# Patient Record
Sex: MALE | Race: WHITE
[De-identification: names, ages, dates, MRNs, and addresses within clinical notes are randomized per-mention and may not be internally consistent; named-entity substitution may affect disease eponyms.]

---

## 2020-01-20 ENCOUNTER — HOSPITAL ENCOUNTER (OUTPATIENT)
Dept: HOSPITAL 92 - SCSULT | Age: 25
Discharge: HOME | End: 2020-01-20
Attending: FAMILY MEDICINE
Payer: COMMERCIAL

## 2020-01-20 DIAGNOSIS — N50.89: Primary | ICD-10-CM

## 2020-01-20 PROCEDURE — 76870 US EXAM SCROTUM: CPT

## 2020-01-20 PROCEDURE — 93976 VASCULAR STUDY: CPT

## 2020-01-20 NOTE — ULT
SCROTAL ULTRASOUND



INDICATION: Palpable abnormality in the right hemiscrotum that comes and goes; history of right-sided
 scrotal cyst



TECHNIQUE: Grayscale, color Doppler spectral Doppler images were obtained of the scrotum.



COMPARISON: None.



FINDINGS:



Right Testicle:

 

Size:  3.6 x 5.7 x 2.6 cm. 

Flow: There is normal vascular flow to the right testicle 

Hydrocele: No right-sided hydrocele is evident

Epididymis:  The right epididymis appears within normal limits.





Left Testicle: 



Size:  3.9 x 5.3 x 2.8 cm. 

Flow: There is normal vascular flow to left testicle.  

Hydrocele: No left sided hydrocele seen.



Epididymis:  The left epididymis appears within normal limits.





Additional findings: None.



Impression:

1. No evidence of testicular torsion or intratesticular mass.



Reported By: Seymour Crow 

Electronically Signed:  1/20/2020 1:49 PM